# Patient Record
Sex: FEMALE | Race: OTHER | NOT HISPANIC OR LATINO
[De-identification: names, ages, dates, MRNs, and addresses within clinical notes are randomized per-mention and may not be internally consistent; named-entity substitution may affect disease eponyms.]

---

## 2021-06-01 ENCOUNTER — RESULT REVIEW (OUTPATIENT)
Age: 51
End: 2021-06-01

## 2022-03-25 PROBLEM — Z00.00 ENCOUNTER FOR PREVENTIVE HEALTH EXAMINATION: Status: ACTIVE | Noted: 2022-03-25

## 2022-04-09 ENCOUNTER — NON-APPOINTMENT (OUTPATIENT)
Age: 52
End: 2022-04-09

## 2022-04-13 ENCOUNTER — APPOINTMENT (OUTPATIENT)
Dept: BREAST CENTER | Facility: CLINIC | Age: 52
End: 2022-04-13
Payer: COMMERCIAL

## 2022-04-13 DIAGNOSIS — N64.89 OTHER SPECIFIED DISORDERS OF BREAST: ICD-10-CM

## 2022-04-13 DIAGNOSIS — Z78.9 OTHER SPECIFIED HEALTH STATUS: ICD-10-CM

## 2022-04-13 PROCEDURE — 99205 OFFICE O/P NEW HI 60 MIN: CPT

## 2022-04-17 NOTE — CONSULT LETTER
[Dear  ___] : Dear ~YIMI, [Consult Letter:] : I had the pleasure of evaluating your patient, [unfilled]. [Please see my note below.] : Please see my note below. [Consult Closing:] : Thank you very much for allowing me to participate in the care of this patient.  If you have any questions, please do not hesitate to contact me. [Sincerely,] : Sincerely, [FreeTextEntry2] : Dr. Aisha Boyd [FreeTextEntry3] : Madhu\par \par Madhu Tejeda MD\par Chief of Breast Surgery\par Director of Breast Cancer Program\par [facility]\par \par \par

## 2022-04-17 NOTE — HISTORY OF PRESENT ILLNESS
[FreeTextEntry1] : 52 year old female was referred by Dr. Boyd presents for initial evaluation regarding right breast 10:00 [facility]  [facility]re[facility] guided vacuum biopsy first seen as posterior depth grouped amorphous calcifications on screening mammogram. Of note, patient with a history of basal cell carcinoma excised in June 2021. Patient denies prior breast surgeries, palpable masses, nipple discharge, skin changes. Denies family history of breast or ovarian cancer.\par

## 2022-04-17 NOTE — ASSESSMENT
[FreeTextEntry1] : 52 year old female presents for initial evaluation regarding right breast 10:00 LCIS and PASH s/p stereotactic guided vacuum biopsy first seen as posterior depth grouped amorphous calcifications on screening mammogram. Explained patient's biopsy pathology result in detail and answered any questions. Discussed patient's increased risk for developing breast cancer. Our office will first obtain films/slides [facility] and patient will have pre-operative B/L Breast MRI. Will plan for R Localized Excision of LCIS s/p PCP Clearance. Patient met with surgical coordinator today. Patient verbalized understanding and agreement of plan.\par

## 2022-04-17 NOTE — DATA REVIEWED
[FreeTextEntry1] : 2/8/2022 ([facility]) Callback from Screening: Right DX MMG; Limited Right Breast US: Scattered areas of fibroglandular density. Right 10:00 posterior depth grouped amorphous calcifications are at low suspicion for malignancy. Stereotactic guided vacuum biopsy is recommended. At right 11:00 3cmfn there is a 0.5 x 0.4 x 0.2 cm cyst which likely correlates with the mammographic focal asymmetry, no suspicious abnormalities seen sonographically in the upper outer right breast. BIRADS-4A: Low Suspicion for Malignancy. \par \par 3/14/22 R Breast Stereotactic Vacuum Biopsy Pathology ([facility]): R Breast 10:00 (+ calcs) Columnar cell change associated with calcifications, fibrocystic changes, PASH R Breast 10:00 (- calcs) LCIS classic type, focal, columnar cell change, PASH

## 2022-04-17 NOTE — PHYSICAL EXAM
[No Supraclavicular Adenopathy] : no supraclavicular adenopathy [Examined in the supine and seated position] : examined in the supine and seated position [Asymmetrical] : asymmetrical [No dominant masses] : no dominant masses in right breast  [No dominant masses] : no dominant masses left breast [No Nipple Retraction] : no left nipple retraction [No Nipple Discharge] : no left nipple discharge [No Axillary Lymphadenopathy] : no left axillary lymphadenopathy [No Rashes] : no rashes [No Ulceration] : no ulceration [de-identified] : Transverse scar superior to right breast from BCC excision

## 2022-04-21 ENCOUNTER — TRANSCRIPTION ENCOUNTER (OUTPATIENT)
Age: 52
End: 2022-04-21

## 2022-04-22 ENCOUNTER — OUTPATIENT (OUTPATIENT)
Dept: OUTPATIENT SERVICES | Facility: HOSPITAL | Age: 52
LOS: 1 days | End: 2022-04-22

## 2022-04-22 ENCOUNTER — APPOINTMENT (OUTPATIENT)
Dept: MRI IMAGING | Facility: CLINIC | Age: 52
End: 2022-04-22
Payer: COMMERCIAL

## 2022-04-22 PROCEDURE — 77049 MRI BREAST C-+ W/CAD BI: CPT | Mod: 26

## 2022-04-25 ENCOUNTER — NON-APPOINTMENT (OUTPATIENT)
Age: 52
End: 2022-04-25

## 2022-05-04 ENCOUNTER — RESULT REVIEW (OUTPATIENT)
Age: 52
End: 2022-05-04

## 2022-05-04 ENCOUNTER — OUTPATIENT (OUTPATIENT)
Dept: OUTPATIENT SERVICES | Facility: HOSPITAL | Age: 52
LOS: 1 days | End: 2022-05-04
Payer: COMMERCIAL

## 2022-05-04 DIAGNOSIS — D05.01 LOBULAR CARCINOMA IN SITU OF RIGHT BREAST: ICD-10-CM

## 2022-05-04 LAB — SURGICAL PATHOLOGY STUDY: SIGNIFICANT CHANGE UP

## 2022-05-04 PROCEDURE — 88321 CONSLTJ&REPRT SLD PREP ELSWR: CPT

## 2022-05-11 ENCOUNTER — APPOINTMENT (OUTPATIENT)
Dept: MAMMOGRAPHY | Facility: HOSPITAL | Age: 52
End: 2022-05-11
Payer: COMMERCIAL

## 2022-05-11 ENCOUNTER — OUTPATIENT (OUTPATIENT)
Dept: OUTPATIENT SERVICES | Facility: HOSPITAL | Age: 52
LOS: 1 days | End: 2022-05-11
Payer: COMMERCIAL

## 2022-05-11 PROCEDURE — A4648: CPT

## 2022-05-11 PROCEDURE — 19281 PERQ DEVICE BREAST 1ST IMAG: CPT | Mod: RT

## 2022-05-11 PROCEDURE — 19281 PERQ DEVICE BREAST 1ST IMAG: CPT

## 2022-05-13 ENCOUNTER — NON-APPOINTMENT (OUTPATIENT)
Age: 52
End: 2022-05-13

## 2022-05-16 ENCOUNTER — LABORATORY RESULT (OUTPATIENT)
Age: 52
End: 2022-05-16

## 2022-05-18 ENCOUNTER — TRANSCRIPTION ENCOUNTER (OUTPATIENT)
Age: 52
End: 2022-05-18

## 2022-05-18 NOTE — ASU PATIENT PROFILE, ADULT - NS PREOP UNDERSTANDS INFO
No solid food or dairy after midnight, water, black coffee no milk, or clear apple juice 10:45am, 5/19/22/yes

## 2022-05-19 ENCOUNTER — APPOINTMENT (OUTPATIENT)
Dept: BREAST CENTER | Facility: AMBULATORY SURGERY CENTER | Age: 52
End: 2022-05-19

## 2022-05-19 ENCOUNTER — TRANSCRIPTION ENCOUNTER (OUTPATIENT)
Age: 52
End: 2022-05-19

## 2022-05-19 ENCOUNTER — OUTPATIENT (OUTPATIENT)
Dept: OUTPATIENT SERVICES | Facility: HOSPITAL | Age: 52
LOS: 1 days | Discharge: HOME | End: 2022-05-19
Payer: COMMERCIAL

## 2022-05-19 ENCOUNTER — RESULT REVIEW (OUTPATIENT)
Age: 52
End: 2022-05-19

## 2022-05-19 VITALS
RESPIRATION RATE: 16 BRPM | OXYGEN SATURATION: 99 % | HEART RATE: 57 BPM | SYSTOLIC BLOOD PRESSURE: 123 MMHG | DIASTOLIC BLOOD PRESSURE: 73 MMHG | TEMPERATURE: 97 F

## 2022-05-19 VITALS
HEIGHT: 70 IN | SYSTOLIC BLOOD PRESSURE: 102 MMHG | HEART RATE: 70 BPM | OXYGEN SATURATION: 99 % | TEMPERATURE: 97 F | RESPIRATION RATE: 16 BRPM | DIASTOLIC BLOOD PRESSURE: 68 MMHG | WEIGHT: 175.05 LBS

## 2022-05-19 DIAGNOSIS — D05.01 LOBULAR CARCINOMA IN SITU OF RIGHT BREAST: ICD-10-CM

## 2022-05-19 DIAGNOSIS — N70.92 OOPHORITIS, UNSPECIFIED: Chronic | ICD-10-CM

## 2022-05-19 PROCEDURE — 88307 TISSUE EXAM BY PATHOLOGIST: CPT | Mod: 26

## 2022-05-19 PROCEDURE — 76098 X-RAY EXAM SURGICAL SPECIMEN: CPT | Mod: 26

## 2022-05-19 PROCEDURE — 88305 TISSUE EXAM BY PATHOLOGIST: CPT | Mod: 26

## 2022-05-19 PROCEDURE — 19301 PARTIAL MASTECTOMY: CPT | Mod: RT

## 2022-05-19 RX ORDER — SODIUM CHLORIDE 9 MG/ML
1000 INJECTION, SOLUTION INTRAVENOUS
Refills: 0 | Status: DISCONTINUED | OUTPATIENT
Start: 2022-05-19 | End: 2022-05-19

## 2022-05-19 RX ORDER — ACETAMINOPHEN 500 MG
1000 TABLET ORAL ONCE
Refills: 0 | Status: DISCONTINUED | OUTPATIENT
Start: 2022-05-19 | End: 2022-05-19

## 2022-05-19 RX ORDER — ONDANSETRON 8 MG/1
4 TABLET, FILM COATED ORAL ONCE
Refills: 0 | Status: DISCONTINUED | OUTPATIENT
Start: 2022-05-19 | End: 2022-05-19

## 2022-05-19 RX ORDER — OXYCODONE HYDROCHLORIDE 5 MG/1
5 TABLET ORAL ONCE
Refills: 0 | Status: DISCONTINUED | OUTPATIENT
Start: 2022-05-19 | End: 2022-05-19

## 2022-05-19 RX ORDER — OXYCODONE HYDROCHLORIDE 5 MG/1
10 TABLET ORAL ONCE
Refills: 0 | Status: DISCONTINUED | OUTPATIENT
Start: 2022-05-19 | End: 2022-05-19

## 2022-05-19 RX ORDER — HYDROMORPHONE HYDROCHLORIDE 2 MG/ML
0.5 INJECTION INTRAMUSCULAR; INTRAVENOUS; SUBCUTANEOUS
Refills: 0 | Status: DISCONTINUED | OUTPATIENT
Start: 2022-05-19 | End: 2022-05-19

## 2022-05-19 RX ADMIN — OXYCODONE HYDROCHLORIDE 5 MILLIGRAM(S): 5 TABLET ORAL at 20:49

## 2022-05-19 RX ADMIN — SODIUM CHLORIDE 75 MILLILITER(S): 9 INJECTION, SOLUTION INTRAVENOUS at 19:44

## 2022-05-19 RX ADMIN — SODIUM CHLORIDE 75 MILLILITER(S): 9 INJECTION, SOLUTION INTRAVENOUS at 20:39

## 2022-05-19 NOTE — ASU DISCHARGE PLAN (ADULT/PEDIATRIC) - ASU DC SPECIAL INSTRUCTIONSFT
Follow up with Dr. Tejeda in 1 week. Call the office at  to schedule your appointment.  You may remove the dressing in 48 hours and shower; allow soap and water to run over the wound, do not scrub, pat dry when finished. Continue to apply gauze as needed and wear a supportive bra for 2 weeks. No tub bathing or swimming until cleared. Keep incision sites out of the sun as scars will darken. No heavy lifting (>10lbs) or strenuous exercise. Diet: Regular diet as tolerated. 64 fluid ounces water daily. Drink small sips throughout the day. Continue diet as outlined by your surgeon. You should be urinating at least 3-4x per day. Call the office if you experience increasing abdominal pain, nausea, vomiting, or temperature >100.4F.  NO ASPIRIN OR NSAIDs until approved by Dr. Tejeda. Avoid alcoholic beverages until cleared by Dr. Tejeda.    Please take Tylenol as directed over the counter every 6 hours as needed for discomfort, do not exceed 4,000mg Tylenol/acetaminophen in 24hrs. You may alternate every 3 hours with Advil/ibuprofen; use as directed on the bottle.

## 2022-05-19 NOTE — ASU DISCHARGE PLAN (ADULT/PEDIATRIC) - CARE PROVIDER_API CALL
Madhu Tejeda)  Surgery  [address] [address]  Phone: [phone]  Fax: [phone]  Follow Up Time:

## 2022-05-19 NOTE — ASU DISCHARGE PLAN (ADULT/PEDIATRIC) - NS MD DC FALL RISK RISK
For information on Fall & Injury Prevention, visit: [email or web address] OR  [email or web address] OR  https://www.cdc.gov/steadi/patient.html

## 2022-05-19 NOTE — BRIEF OPERATIVE NOTE - NSICDXBRIEFPREOP_GEN_ALL_CORE_FT
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

## 2022-05-19 NOTE — BRIEF OPERATIVE NOTE - OPERATION/FINDINGS
Right breast prepped and draped. Lilliana-areolar incision made. Dissected down to smart clip using sharp and blunt dissection under Envisio guidance. Specimen marked with silk suture and radiographed ex vivo to confirm clip excision. Hemostasis achieved using electrocautery. Wound closed with 2-0 vicryl and 4-0 monocryl sutures. Steri strips applied, fluff gauze placed, and chest binder applied. Patient transferred to PACU in stable condition.

## 2022-05-24 PROBLEM — Z78.9 OTHER SPECIFIED HEALTH STATUS: Chronic | Status: ACTIVE | Noted: 2022-05-19

## 2022-05-25 LAB — SURGICAL PATHOLOGY STUDY: SIGNIFICANT CHANGE UP

## 2022-05-31 DIAGNOSIS — R92.8 OTHER ABNORMAL AND INCONCLUSIVE FINDINGS ON DIAGNOSTIC IMAGING OF BREAST: ICD-10-CM

## 2022-06-01 ENCOUNTER — APPOINTMENT (OUTPATIENT)
Dept: BREAST CENTER | Facility: CLINIC | Age: 52
End: 2022-06-01
Payer: COMMERCIAL

## 2022-06-01 VITALS — HEART RATE: 110 BPM | OXYGEN SATURATION: 98 %

## 2022-06-01 DIAGNOSIS — N60.91 UNSPECIFIED BENIGN MAMMARY DYSPLASIA OF RIGHT BREAST: ICD-10-CM

## 2022-06-01 DIAGNOSIS — D05.01 LOBULAR CARCINOMA IN SITU OF RIGHT BREAST: ICD-10-CM

## 2022-06-01 PROCEDURE — 99024 POSTOP FOLLOW-UP VISIT: CPT

## 2022-06-02 PROBLEM — N60.91 ATYPICAL DUCTAL HYPERPLASIA OF RIGHT BREAST: Status: ACTIVE | Noted: 2022-06-02

## 2022-06-02 PROBLEM — D05.01 LOBULAR CARCINOMA IN SITU (LCIS) OF RIGHT BREAST: Status: ACTIVE | Noted: 2022-04-13

## 2022-06-03 NOTE — HISTORY OF PRESENT ILLNESS
[FreeTextEntry1] : 52 year old female presents for post-operative evaluation regarding right breast 10:00 LCIS and PASH first seen as posterior depth grouped amorphous calcifications on screening mammogram, s/p right localized excision 5/19/22, final surgical pathology yielding LCIS, focal ADH, prior procedure site changes of right breast excision and LCIS, fibrocystic changes and prior procedure site changes of right breast new lateral margin. \par \par Denies any fever, chills, redness, pain, or discharge at the incision site. \par \par Of note, patient with a history of basal cell carcinoma excised in June 2021.\par \par Denies family history of breast or ovarian cancer.

## 2022-06-03 NOTE — ASSESSMENT
[FreeTextEntry1] : 52 year old female presents for post-operative evaluation regarding right breast 10:00 LCIS and PASH first seen as posterior depth grouped amorphous calcifications on screening mammogram, s/p right localized excision 5/19/22, final surgical pathology yielding LCIS, focal ADH, prior procedure site changes of right breast excision and LCIS, fibrocystic changes and prior procedure site changes of right breast new lateral margin.\par \par Given ADH pathology, discussed with the patient the implications for her lifetime risk, which is considered to be at high risk to develop breast cancer over the span of her lifetime. Reviewed prevention options from lifestyle, radiological surveillance and anti-estrogen maintenance. Will refer patient to med onc Dr. Collins for risk reduction. \par \par Patient is doing well post-operatively. Suture ends were cut and steri-strips were reapplied. Patient is to return in September 2022 for R DX mammogram and reexamination. All patient questions were answered; she verbalized understanding of the information and plan of care.\par \par \par

## 2022-06-03 NOTE — PHYSICAL EXAM
[No Supraclavicular Adenopathy] : no supraclavicular adenopathy [Examined in the supine and seated position] : examined in the supine and seated position [Asymmetrical] : asymmetrical [No dominant masses] : no dominant masses in right breast  [No dominant masses] : no dominant masses left breast [No Nipple Retraction] : no left nipple retraction [No Nipple Discharge] : no left nipple discharge [No Axillary Lymphadenopathy] : no left axillary lymphadenopathy [No Rashes] : no rashes [No Ulceration] : no ulceration [de-identified] : Transverse scar superior to right breast from BCC excision [de-identified] : incision c/d/i

## 2022-06-03 NOTE — DATA REVIEWED
[FreeTextEntry1] : 2/8/2022 ([facility] Radiology) Callback from Screening: Right DX MMG; Limited Right Breast US: Scattered areas of fibroglandular density. Right 10:00 posterior depth grouped amorphous calcifications are at low suspicion for malignancy. Stereotactic guided vacuum biopsy is recommended. At right 11:00 3cmfn there is a 0.5 x 0.4 x 0.2 cm cyst which likely correlates with the mammographic focal asymmetry, no suspicious abnormalities seen sonographically in the upper outer right breast. BIRADS-4A: Low Suspicion for Malignancy.\par \par 3/14/22 R Breast Stereotactic Vacuum Biopsy Pathology ([facility]): R Breast 10:00 (+ calcs) Columnar cell change associated with calcifications, fibrocystic changes, PAS R Breast 10:00 (- calcs) LCIS classic type, focal, columnar cell change, PASH\par \par 4/22/2022 ([facility]) B/L MRI:\par Surgical management is recommended of known region of LCIS within the right breast. No suspicious enhancement in either breast to warrant biopsy. Surgical management is recommended of known region of LCIS within the right breast. BI-RADS 2 - Benign\par \par 5/19/22 ([facility] Surgical Path) RIGHT BREAST EXCISION: Lobular carcinoma in situ (LCIS). Focal atypical duct hyperpalsia (ADH). Prior procedure site changes. RIGHT BREAST NEW LATERAL MARGIN: Lobular carcinoma in situ (LCIS), fibrocystic changes and prior procedure site changes.

## 2022-08-24 ENCOUNTER — NON-APPOINTMENT (OUTPATIENT)
Age: 52
End: 2022-08-24

## 2022-09-12 ENCOUNTER — APPOINTMENT (OUTPATIENT)
Dept: MAMMOGRAPHY | Facility: CLINIC | Age: 52
End: 2022-09-12

## 2022-09-28 ENCOUNTER — APPOINTMENT (OUTPATIENT)
Dept: BREAST CENTER | Facility: CLINIC | Age: 52
End: 2022-09-28

## 2023-02-13 ENCOUNTER — APPOINTMENT (OUTPATIENT)
Dept: MAMMOGRAPHY | Facility: CLINIC | Age: 53
End: 2023-02-13
Payer: COMMERCIAL

## 2023-02-13 ENCOUNTER — OUTPATIENT (OUTPATIENT)
Dept: OUTPATIENT SERVICES | Facility: HOSPITAL | Age: 53
LOS: 1 days | End: 2023-02-13

## 2023-02-13 ENCOUNTER — APPOINTMENT (OUTPATIENT)
Dept: ULTRASOUND IMAGING | Facility: CLINIC | Age: 53
End: 2023-02-13
Payer: COMMERCIAL

## 2023-02-13 DIAGNOSIS — N70.92 OOPHORITIS, UNSPECIFIED: Chronic | ICD-10-CM

## 2023-02-13 PROCEDURE — 76641 ULTRASOUND BREAST COMPLETE: CPT | Mod: 26,50

## 2023-02-13 PROCEDURE — 77063 BREAST TOMOSYNTHESIS BI: CPT | Mod: 26

## 2023-02-13 PROCEDURE — 77067 SCR MAMMO BI INCL CAD: CPT | Mod: 26

## 2023-09-27 NOTE — ASU PREOP CHECKLIST - DENTURES
Spoke with patient regarding [employer] and [facility]. The patient states he no longer has [employer], [facility] and will bring [facility] tomorrow for his appointment.
no

## (undated) DEVICE — SUT VICRYL 2-0 27" CT-1 UNDYED

## (undated) DEVICE — DRSG STERISTRIPS 0.5 X 4"

## (undated) DEVICE — ELCTR PENCIL SMOKE EVACUATOR COATED PUSH BUTTON 70MM

## (undated) DEVICE — NAVIGATOR ENVISIO NAV SLIM

## (undated) DEVICE — PREP CHLORAPREP HI-LITE ORANGE 26ML

## (undated) DEVICE — BRA PINK LG 36-39"

## (undated) DEVICE — BRA PINK MED 33-36"

## (undated) DEVICE — BRA PINK XL 39-42"

## (undated) DEVICE — WARMING BLANKET FULL ADULT

## (undated) DEVICE — PACK GENERAL MINOR

## (undated) DEVICE — DRAPE TOP SHEET 53" X 101"

## (undated) DEVICE — GLV 7.5 PROTEXIS (WHITE)

## (undated) DEVICE — SUT SILK 2-0 30" SH

## (undated) DEVICE — BRA PINK 4XL 48-51"

## (undated) DEVICE — BRA PINK 3XL 45-48"

## (undated) DEVICE — VENODYNE/SCD SLEEVE CALF MEDIUM

## (undated) DEVICE — ELCTR BOVIE TIP BLADE INSULATED 2.8" EDGE WITH SAFETY

## (undated) DEVICE — SUT VICRYL 4-0 27" SH UNDYED

## (undated) DEVICE — BRA PINK 2XL 42-45"

## (undated) DEVICE — DRAPE PROBE COVER 5" X 96"

## (undated) DEVICE — ELCTR BOVIE TIP BLADE MEGADYNE E-Z CLEAN 4" EXTENDED